# Patient Record
Sex: MALE | URBAN - METROPOLITAN AREA
[De-identification: names, ages, dates, MRNs, and addresses within clinical notes are randomized per-mention and may not be internally consistent; named-entity substitution may affect disease eponyms.]

---

## 2019-04-24 ENCOUNTER — APPOINTMENT (RX ONLY)
Dept: URBAN - METROPOLITAN AREA CLINIC 18 | Facility: CLINIC | Age: 19
Setting detail: DERMATOLOGY
End: 2019-04-24

## 2019-04-24 DIAGNOSIS — L81.4 OTHER MELANIN HYPERPIGMENTATION: ICD-10-CM

## 2019-04-24 DIAGNOSIS — D22 MELANOCYTIC NEVI: ICD-10-CM

## 2019-04-24 PROBLEM — D22.5 MELANOCYTIC NEVI OF TRUNK: Status: ACTIVE | Noted: 2019-04-24

## 2019-04-24 PROCEDURE — ? BIOPSY BY SHAVE METHOD

## 2019-04-24 PROCEDURE — 99202 OFFICE O/P NEW SF 15 MIN: CPT | Mod: 25

## 2019-04-24 PROCEDURE — ? COUNSELING

## 2019-04-24 PROCEDURE — 11102 TANGNTL BX SKIN SINGLE LES: CPT

## 2019-04-24 ASSESSMENT — LOCATION ZONE DERM
LOCATION ZONE: TRUNK
LOCATION ZONE: ARM

## 2019-04-24 ASSESSMENT — LOCATION DETAILED DESCRIPTION DERM
LOCATION DETAILED: LEFT SUPERIOR UPPER BACK
LOCATION DETAILED: RIGHT POSTERIOR SHOULDER
LOCATION DETAILED: LEFT POSTERIOR SHOULDER

## 2019-04-24 ASSESSMENT — LOCATION SIMPLE DESCRIPTION DERM
LOCATION SIMPLE: LEFT SHOULDER
LOCATION SIMPLE: RIGHT SHOULDER
LOCATION SIMPLE: LEFT UPPER BACK

## 2019-04-24 NOTE — PROCEDURE: BIOPSY BY SHAVE METHOD
Post-Care Instructions: I reviewed with the patient in detail post-care instructions. Patient is to keep the biopsy site dry overnight, and then apply bacitracin twice daily until healed. Patient may apply hydrogen peroxide soaks to remove any crusting.
Dressing: bandage
Additional Anesthesia Volume In Cc (Will Not Render If 0): 0
Detail Level: Detailed
Notification Instructions: Patient will be notified of biopsy results. However, patient instructed to call the office if not contacted within 2 weeks.
Bill 60507 For Specimen Handling/Conveyance To Laboratory?: no
Anesthesia Volume In Cc: 0.5
Type Of Destruction Used: Curettage
Anesthesia Type: 1% lidocaine with epinephrine and a 1:10 solution of 8.4% sodium bicarbonate
Size Of Lesion In Cm: 0.7
Depth Of Biopsy: dermis
Biopsy Method: Dermablade
Biopsy Type: H and E
Wound Care: Polysporin ointment
Billing Type: Third-Party Bill
Silver Nitrate Text: The wound bed was treated with silver nitrate after the biopsy was performed.
Hemostasis: Ferric chloride
Electrodesiccation Text: The wound bed was treated with electrodesiccation after the biopsy was performed.
Curettage Text: The wound bed was treated with curettage after the biopsy was performed.
Was A Bandage Applied: Yes
Cryotherapy Text: The wound bed was treated with cryotherapy after the biopsy was performed.
Electrodesiccation And Curettage Text: The wound bed was treated with electrodesiccation and curettage after the biopsy was performed.
Consent: Written consent was obtained and risks were reviewed including but not limited to scarring, infection, bleeding, scabbing, incomplete removal, nerve damage and allergy to anesthesia.

## 2022-01-25 ENCOUNTER — OFFICE VISIT (OUTPATIENT)
Dept: FAMILY MEDICINE | Facility: CLINIC | Age: 22
End: 2022-01-25

## 2022-01-25 ENCOUNTER — CLINICAL SUPPORT (OUTPATIENT)
Dept: FAMILY MEDICINE | Facility: CLINIC | Age: 22
End: 2022-01-25

## 2022-01-25 ENCOUNTER — LAB (OUTPATIENT)
Dept: FAMILY MEDICINE | Facility: CLINIC | Age: 22
End: 2022-01-25

## 2022-01-25 VITALS
DIASTOLIC BLOOD PRESSURE: 72 MMHG | RESPIRATION RATE: 16 BRPM | HEIGHT: 72 IN | WEIGHT: 173.8 LBS | HEART RATE: 98 BPM | SYSTOLIC BLOOD PRESSURE: 124 MMHG | OXYGEN SATURATION: 98 % | BODY MASS INDEX: 23.54 KG/M2 | TEMPERATURE: 98.4 F

## 2022-01-25 DIAGNOSIS — Z02.1 PRE-EMPLOYMENT EXAMINATION: Primary | ICD-10-CM

## 2022-01-25 DIAGNOSIS — Z02.1 PHYSICAL EXAM, PRE-EMPLOYMENT: Primary | ICD-10-CM

## 2022-01-25 DIAGNOSIS — Z01.10 ENCOUNTER FOR HEARING EXAMINATION WITHOUT ABNORMAL FINDINGS: Primary | ICD-10-CM

## 2022-01-25 PROCEDURE — 81003 URINALYSIS AUTO W/O SCOPE: CPT | Performed by: FAMILY MEDICINE

## 2022-01-25 PROCEDURE — 99455 WORK RELATED DISABILITY EXAM: CPT | Performed by: FAMILY MEDICINE

## 2022-01-25 PROCEDURE — 92551 PURE TONE HEARING TEST AIR: CPT

## 2022-01-25 PROCEDURE — 93005 ELECTROCARDIOGRAM TRACING: CPT | Performed by: FAMILY MEDICINE

## 2022-01-25 ASSESSMENT — PAIN SCALES - GENERAL: PAINLEVEL: 0-NO PAIN

## 2022-01-26 ENCOUNTER — TELEPHONE (OUTPATIENT)
Dept: FAMILY MEDICINE | Facility: CLINIC | Age: 22
End: 2022-01-26

## 2022-01-26 PROCEDURE — 93000 ELECTROCARDIOGRAM COMPLETE: CPT | Performed by: INTERNAL MEDICINE

## 2022-01-26 NOTE — TELEPHONE ENCOUNTER
Caller would like to discuss (a) return call Writer has advised caller of a callback from within 24 hours.    Patient: Reece Morris    Caller Name (Last and first, relation/role): Lamar     Name of Facility: [facility]     Callback Number: [phone] option 2    Best Availability: anytime    Fax Number: n/a     Additional Info: per Lamar, wanting to know if patient was wearing hearing aids at time of audiogram. Please advise.     Did you confirm the message with the caller: Yes    Is it okay that the nurse communicates your response through [employer]? No

## 2022-01-31 ENCOUNTER — APPOINTMENT (OUTPATIENT)
Dept: LAB | Facility: CLINIC | Age: 22
End: 2022-01-31

## 2022-01-31 PROCEDURE — 99000 SPECIMEN HANDLING OFFICE-LAB: CPT | Performed by: FAMILY MEDICINE

## 2022-02-01 NOTE — PROGRESS NOTES
SUBJECTIVE:  Reece Morris is a 22 y.o. male presenting for work physical. See scanned documents on 1/25/2022 for details.    Past Medical History:   Diagnosis Date   • Allergic rhinitis      Past Surgical History:   Procedure Laterality Date   • TONSILLECTOMY     • TYMPANOSTOMY TUBE PLACEMENT        Family History   Problem Relation Age of Onset   • Thyroid disease Mother    • No Known Problems Father    • Thyroid disease Sister    • No Known Problems Sister      No current outpatient medications on file.     No current facility-administered medications for this visit.      No Known Allergies    REVIEW OF SYSTEMS:  Constitutional: No unexpected change in weight, no fatigue, no unexplained fevers, sweats or chills.  Eye: No recent significant change in vision, no eye pain, redness, discharge  Ear: No ear pain, no tinnitus or vertigo, no recent change in hearing  Mouth/Throat: No sore throat, no recent change in voice or hoarseness  Neck: No lumps or masses, no swollen glands, no recent swelling in thyroid area, no significant pain in neck  Pulmonary: No chronic cough, sputum, or hemoptysis, no dyspnea on exertion, no wheezing, no shortness of breath  Cardiovascular: No exercise intolerance, no chest pain, no diaphoresis, no edema, no palpitations  Gastrointestinal: No abdominal pain, no change in bowel habits, no significant change in appetite, no nausea, vomiting, diarrhea, or constipation  Musculoskeletal/Extremities: No pain, redness or swelling on the joints  Heme/Allergy/Immune: No abnormal bleeding, no bruising, no night sweats  Skin/Integumentary: No abnormal skin lesions noted, no evidence of rash  Neurologic: No chronic headaches, no seizures, no weakness, no numbness or tingling  Psychiatric: No depression, no anxiety, no psychosis.  Endocrine: Negative for polydipsia, dry mouth  The patient denies urinary hesitancy, nocturia, frequency or incomplete voiding.    OBJECTIVE:   The patient appears well, in NAD.    /72 (BP Location: Right arm, Patient Position: Sitting)   Pulse 98   Temp 36.9 °C (98.4 °F) (Temporal)   Resp 16   Ht 1.829 m (6')   Wt 78.8 kg (173 lb 12.8 oz)   SpO2 98%   BMI 23.57 kg/m²   HEENT: Ears normal. Throat and pharynx normal. Sinuses non-tender. Neck supple. No adenopathy or thyromegaly. PERRLA.   Lungs: Clear, good air entry, no wheezes, rhonchi or rales.   Heart: S1 and S2 normal, no murmurs, regular rate and rhythm.   Abdomen: Soft without tenderness, guarding, mass or organomegaly.  Extremities: No edema, no varicosities.   Neurological: Cranial nerves and fundi are normal.   Skin: I note only benign skin findings. No unusual rashes or suspicious skin lesions noted. Nails appear normal.      ASSESSMENT/PLAN:   Diagnosis Plan   1. Pre-employment examination  ECG 12 lead -Normal, Today          Jero Shen MD